# Patient Record
Sex: FEMALE | Race: WHITE | Employment: OTHER | URBAN - METROPOLITAN AREA
[De-identification: names, ages, dates, MRNs, and addresses within clinical notes are randomized per-mention and may not be internally consistent; named-entity substitution may affect disease eponyms.]

---

## 2019-12-02 ENCOUNTER — TELEPHONE (OUTPATIENT)
Dept: GYNECOLOGY | Age: 69
End: 2019-12-02

## 2019-12-02 ENCOUNTER — OFFICE VISIT (OUTPATIENT)
Dept: GYNECOLOGY | Age: 69
End: 2019-12-02

## 2019-12-02 VITALS
HEIGHT: 64 IN | BODY MASS INDEX: 28.17 KG/M2 | WEIGHT: 165 LBS | DIASTOLIC BLOOD PRESSURE: 79 MMHG | SYSTOLIC BLOOD PRESSURE: 114 MMHG

## 2019-12-02 DIAGNOSIS — R19.00 PELVIC MASS: Primary | ICD-10-CM

## 2019-12-02 RX ORDER — NITROFURANTOIN 25; 75 MG/1; MG/1
CAPSULE ORAL
Refills: 0 | COMMUNITY
Start: 2019-11-27 | End: 2019-12-12

## 2019-12-02 NOTE — LETTER
2019 12:09 PM 
 
Patient:  Phuc Shipman YOB: 1950 Date of Visit: 2019 Dear Aston Xavier MD 
[address] [address] VIA In Basket Tamika Miller MD 
[address] [facility] [address] [place] [address] VIA Facsimile: [phone] 
 : 
 
 
Thank you for referring Ms. Phuc Shipman to me for evaluation/treatment. Below are the relevant portions of my assessment and plan of care. [address], [address] [facility] [address], [address] 
P [phone]  F [phone] Office Note Patient ID: 
Name:  Phuc Shipman MRN:  [id number] :  1950/69 y.o. Date:  2019 HISTORY OF PRESENT ILLNESS: 
Phuc Shipman is a 77 y.o.  postmenopausal female who is being seen for a large pelvic mass. She is referred by Dr. Fredi Rolon. She was being evaluated for hematuria when a CT of the abdomen/pelvis and subsequent pelvic ultrasound both demonstrated a large cystic pelvic mass, likely arising from the right ovary, measuring 14 x 9 x 18 cm. On CT the mass appeared simple, other than a thin vascularized septum. There were no appreciable solid components. The left ovary appeared normal.  The uterus also appeared normal, aside from some coarse calcifications, likely secondary to leiomyomas. There was some questionable mild thickening of the endocervical canal.  There was no free fluid in the the abdomen and no evidence of metastatic disease. On ultrasound, the mass appeared mor complex, with multiple septations. Due to the possibility of malignancy, I have been asked to see her in consultation for further evaluation and management. A CA-125 drawn on 19 was normal at 15.8. Dr. Breann Hagen had performed a cystoscopy and was concerned about a possible bladder cancer. He is planning an outpatient cystoscopy with biopsy/resection of the affected areas.    
  
 
ROS: 
  and GI review:  Negative Cardiopulmonary review:  Negative Musculoskeletal:  Negative A comprehensive review of systems was negative except for that written in the History of Present Illness. , 10 point ROS 
 
 
OB/GYN ROS: 
 There is no history of significant gyn problems or procedures. Patient denies any abnormal bleeding or vaginal discharge. Problem List: 
There are no active problems to display for this patient. PMH: 
Past Medical History:  
Diagnosis Date  Emphysema of lung ([address]  Hx: UTI (urinary tract infection)  Renal cyst   
  
PSH: 
Past Surgical History:  
Procedure Laterality Date  HX OTHER SURGICAL Left   
 fibrocystic tumor  left breast  
 HX TONSILLECTOMY Social History: 
Social History Tobacco Use  Smoking status: Current Every Day Smoker  Smokeless tobacco: Never Used Substance Use Topics  Alcohol use: Not on file Family History: 
History reviewed. No pertinent family history. Medications: (reviewed) Current Outpatient Medications Medication Sig  
 nitrofurantoin, macrocrystal-monohydrate, (MACROBID) 100 mg capsule TAKE 1 CAPSULE BY MOUTH TWICE DAILY No current facility-administered medications for this visit. Allergies: (reviewed) Allergies Allergen Reactions  Cat Dander Other (comments)  Ciprofloxacin Itching  Iodine Other (comments)  Mustard Other (comments)  Sesame Seed Other (comments)  Shellfish Derived Other (comments)  Sulfa (Sulfonamide Antibiotics) Other (comments) Swelling in throat and eyes  Tree Nut Other (comments) OBJECTIVE: 
 
Physical Exam: VITAL SIGNS: Vitals:  
 19 1042 BP: 114/79 Weight: 165 lb (74.8 kg) Height: 5' 4\" (1.626 m) Body mass index is 28.32 kg/m². GENERAL ETHEL: Conversant, alert, oriented. No acute distress. HEENT: HEENT. No thyroid enlargement. No JVD. Neck: Supple without restrictions. RESPIRATORY: Clear to auscultation and percussion to the bases. No CVAT. CARDIOVASC: RRR without murmur/rub. GASTROINT: soft, non-tender, non-distended; palpable mass in lower abdomen MUSCULOSKEL: no joint tenderness, deformity or swelling EXTREMITIES: extremities normal, atraumatic, no cyanosis or edema PELVIC: Normal external genitalia. Normal vagina. Normal cervix. Uterus displaced anteriorly by the mass. Cul de sac smooth. No nodularity. RECTAL: Deferred HEATH SURVEY: No suspicious lymphadenopathy or edema noted. NEURO: Grossly intact. No acute deficit. Lab Data: 
 
No results found for: WBC, HGB, HCT, PLT, MCV, HGBEXT, HCTEXT, PLTEXT, HGBEXT, HCTEXT, PLTEXT No results found for: NA, K, CL, CO2, AGAP, GLU, BUN, CREA, BUCR, GFRAA, GFRNA, CA Imaging: Outside CT form [facility] reviewed. Pertinent findings noted in HPI. IMPRESSION/PLAN: 
Nevin Garvey is a 77 y.o. female with a working diagnosis of large cystic pelvic mass, which appears to be arising from the right ovary. She also has some dilatation of the endocervical canal, but no vaginal bleeding or discharge. I reviewed with Nevin Garvey her medical records, physical exam, and review of symptoms. I discussed with her the differential diagnosis of the mass, including both benign and malignant etiologies. I recommended laparoscopic evaluation with resection of the mass with frozen section pathology. I also recommended removing the contralateral ovary. I also recommended hysterectomy as well, due to the dilatation of the endocervical canal.  Clinically the cervix appeared normal.  We will also send the uterus for frozen section pathology. She is aware that a laparotomy might be required, but that would be unlikely. She was counseled on the risks, benefits, indications, and alternatives of surgery. Her questions were answered and she wishes to proceed as planned.  
 
 
 
Signed By: Devendra Franks MD   
 12/2/2019/8:46 AM  
 
 
 
If you have questions, please do not hesitate to call me. I look forward to following Ms. Ana Erasto along with you.  
 
 
 
Sincerely, 
 
 
Romulo Montana MD

## 2019-12-02 NOTE — PATIENT INSTRUCTIONS
[employer] Activation    Thank you for requesting access to [employer]. Please follow the instructions below to securely access and download your online medical record. [employer] allows you to send messages to your doctor, view your test results, renew your prescriptions, schedule appointments, and more. How Do I Sign Up? 1. In your internet browser, go to [email or web address]. 2. Click on the First Time User? Click Here link in the Sign In box. You will see the New Member Sign Up page. 3. Enter your [employer] Access Code exactly as it appears below. You will not need to use this code after youve completed the sign-up process. If you do not sign up before the expiration date, you must request a new code. [employer] Access Code: [email or web address]  Expires: 2020 10:26 AM (This is the date your [employer] access code will )    4. Enter the last four digits of your Social Security Number (xxxx) and Date of Birth (mm/dd/yyyy) as indicated and click Submit. You will be taken to the next sign-up page. 5. Create a [employer] ID. This will be your [employer] login ID and cannot be changed, so think of one that is secure and easy to remember. 6. Create a [employer] password. You can change your password at any time. 7. Enter your Password Reset Question and Answer. This can be used at a later time if you forget your password. 8. Enter your e-mail address. You will receive e-mail notification when new information is available in [address]. 9. Click Sign Up. You can now view and download portions of your medical record. 10. Click the Download Summary menu link to download a portable copy of your medical information. Additional Information    If you have questions, please visit the Frequently Asked Questions section of the [employer] website at [email or web address] Remember, [employer] is NOT to be used for urgent needs. For medical emergencies, dial 911.

## 2019-12-02 NOTE — PROGRESS NOTES
[address] [facility] [address], [address] [facility] [phone]  F [phone]    Office Note  Patient ID:  Name:  Nae Neff  MRN:  [id number]  :  69 y.o. Date:  2019      HISTORY OF PRESENT ILLNESS:  Nae Neff is a 77 y.o.  postmenopausal female who is being seen for a large pelvic mass. She is referred by Dr. Charmayne Guillaume. She was being evaluated for hematuria when a CT of the abdomen/pelvis and subsequent pelvic ultrasound both demonstrated a large cystic pelvic mass, likely arising from the right ovary, measuring 14 x 9 x 18 cm. On CT the mass appeared simple, other than a thin vascularized septum. There were no appreciable solid components. The left ovary appeared normal.  The uterus also appeared normal, aside from some coarse calcifications, likely secondary to leiomyomas. There was some questionable mild thickening of the endocervical canal.  There was no free fluid in the the abdomen and no evidence of metastatic disease. On ultrasound, the mass appeared mor complex, with multiple septations. Due to the possibility of malignancy, I have been asked to see her in consultation for further evaluation and management. A CA-125 drawn on 19 was normal at 15.8. Dr. Liss Villarreal had performed a cystoscopy and was concerned about a possible bladder cancer. He is planning an outpatient cystoscopy with biopsy/resection of the affected areas. ROS:   and GI review:  Negative  Cardiopulmonary review:  Negative   Musculoskeletal:  Negative    A comprehensive review of systems was negative except for that written in the History of Present Illness. , 10 point ROS      OB/GYN ROS:    There is no history of significant gyn problems or procedures. Patient denies any abnormal bleeding or vaginal discharge. Problem List:  There are no active problems to display for this patient.     PMH:  Past Medical History:   Diagnosis Date    Emphysema of lung ([address]     Hx: UTI (urinary tract infection)     Renal cyst       PSH:  Past Surgical History:   Procedure Laterality Date    HX OTHER SURGICAL Left     fibrocystic tumor  left breast    HX TONSILLECTOMY        Social History:  Social History     Tobacco Use    Smoking status: Current Every Day Smoker    Smokeless tobacco: Never Used   Substance Use Topics    Alcohol use: Not on file      Family History:  History reviewed. No pertinent family history. Medications: (reviewed)  Current Outpatient Medications   Medication Sig    nitrofurantoin, macrocrystal-monohydrate, (MACROBID) 100 mg capsule TAKE 1 CAPSULE BY MOUTH TWICE DAILY     No current facility-administered medications for this visit. Allergies: (reviewed)  Allergies   Allergen Reactions    Cat Dander Other (comments)    Ciprofloxacin Itching    Iodine Other (comments)    Mustard Other (comments)    Sesame Seed Other (comments)    Shellfish Derived Other (comments)    Sulfa (Sulfonamide Antibiotics) Other (comments)     Swelling in throat and eyes    Tree Nut Other (comments)          OBJECTIVE:    Physical Exam:  VITAL SIGNS: Vitals:    12/02/19 1042   BP: 114/79   Weight: 165 lb (74.8 kg)   Height: 5' 4\" (1.626 m)     Body mass index is 28.32 kg/m². GENERAL ETHEL: Conversant, alert, oriented. No acute distress. HEENT: HEENT. No thyroid enlargement. No JVD. Neck: Supple without restrictions. RESPIRATORY: Clear to auscultation and percussion to the bases. No CVAT. CARDIOVASC: RRR without murmur/rub. GASTROINT: soft, non-tender, non-distended; palpable mass in lower abdomen   MUSCULOSKEL: no joint tenderness, deformity or swelling   EXTREMITIES: extremities normal, atraumatic, no cyanosis or edema   PELVIC: Normal external genitalia. Normal vagina. Normal cervix. Uterus displaced anteriorly by the mass. Cul de sac smooth. No nodularity.    RECTAL: Deferred   HEATH SURVEY: No suspicious lymphadenopathy or edema noted. NEURO: Grossly intact. No acute deficit. Lab Data:    No results found for: WBC, HGB, HCT, PLT, MCV, HGBEXT, HCTEXT, PLTEXT, HGBEXT, HCTEXT, PLTEXT  No results found for: NA, K, CL, CO2, AGAP, GLU, BUN, CREA, BUCR, GFRAA, GFRNA, CA      Imaging: Outside CT form [facility] reviewed. Pertinent findings noted in HPI. IMPRESSION/PLAN:  Polly Hernandez is a 77 y.o. female with a working diagnosis of large cystic pelvic mass, which appears to be arising from the right ovary. She also has some dilatation of the endocervical canal, but no vaginal bleeding or discharge. I reviewed with Polly Hernandez her medical records, physical exam, and review of symptoms. I discussed with her the differential diagnosis of the mass, including both benign and malignant etiologies. I recommended laparoscopic evaluation with resection of the mass with frozen section pathology. I also recommended removing the contralateral ovary. I also recommended hysterectomy as well, due to the dilatation of the endocervical canal.  Clinically the cervix appeared normal.  We will also send the uterus for frozen section pathology. She is aware that a laparotomy might be required, but that would be unlikely. She was counseled on the risks, benefits, indications, and alternatives of surgery. Her questions were answered and she wishes to proceed as planned.         Signed By: Audrey Dillon MD     12/2/2019/8:46 AM

## 2019-12-10 ENCOUNTER — TELEPHONE (OUTPATIENT)
Dept: GYNECOLOGY | Age: 69
End: 2019-12-10

## 2019-12-10 DIAGNOSIS — G89.18 POST-OPERATIVE PAIN: Primary | ICD-10-CM

## 2019-12-10 NOTE — TELEPHONE ENCOUNTER
The patient called with several questions. First is should she arrange to have the recommended biopsy of her bladder at the same time of her surgery? Secondly, can she have any prescriptions that may be prescribed sent to her pharmacy prior to her surgery because her  is not physically able to pick them up for her ?

## 2019-12-11 RX ORDER — TRAMADOL HYDROCHLORIDE 50 MG/1
50 TABLET ORAL
Qty: 25 TAB | Refills: 0 | OUTPATIENT
Start: 2019-12-11 | End: 2019-12-13 | Stop reason: SDUPTHER

## 2019-12-11 NOTE — TELEPHONE ENCOUNTER
Called the patient and advised she will need to call [facility] to see if they would do it at that time. Valerie Hale does not think they would do that and will want to do that in the office. The patient would like to wait until Mari Short is back from leave for him to perform. I advised the patient that per  that a prescription for the Tramadol will be called to her pharmacy. I also advised to have Colace on hand and Tylenol/Advil.

## 2019-12-12 ENCOUNTER — HOSPITAL ENCOUNTER (OUTPATIENT)
Dept: GENERAL RADIOLOGY | Age: 69
Discharge: HOME | End: 2019-12-12
Attending: OBSTETRICS & GYNECOLOGY
Payer: MEDICARE

## 2019-12-12 ENCOUNTER — HOSPITAL ENCOUNTER (OUTPATIENT)
Dept: PREADMISSION TESTING | Age: 69
Discharge: HOME | End: 2019-12-12
Payer: MEDICARE

## 2019-12-12 VITALS
SYSTOLIC BLOOD PRESSURE: 117 MMHG | WEIGHT: 165 LBS | HEIGHT: 64 IN | BODY MASS INDEX: 28.17 KG/M2 | HEART RATE: 71 BPM | DIASTOLIC BLOOD PRESSURE: 75 MMHG | TEMPERATURE: 97.6 F

## 2019-12-12 LAB
ALBUMIN SERPL-MCNC: 3.7 G/DL (ref 3.5–5)
ALBUMIN/GLOB SERPL: 1.1 {RATIO} (ref 1.1–2.2)
ALP SERPL-CCNC: 78 U/L (ref 45–117)
ALT SERPL-CCNC: 12 U/L (ref 12–78)
ANION GAP SERPL CALC-SCNC: 6 MMOL/L (ref 5–15)
AST SERPL-CCNC: 8 U/L (ref 15–37)
ATRIAL RATE: 58 BPM
BASOPHILS # BLD: 0.1 K/UL (ref 0–0.1)
BASOPHILS NFR BLD: 1 % (ref 0–1)
BILIRUB SERPL-MCNC: 0.4 MG/DL (ref 0.2–1)
BUN SERPL-MCNC: 16 MG/DL (ref 6–20)
BUN/CREAT SERPL: 19 (ref 12–20)
CALCIUM SERPL-MCNC: 8.9 MG/DL (ref 8.5–10.1)
CALCULATED P AXIS, ECG09: 67 DEGREES
CALCULATED R AXIS, ECG10: 6 DEGREES
CALCULATED T AXIS, ECG11: 37 DEGREES
CHLORIDE SERPL-SCNC: 106 MMOL/L (ref 97–108)
CO2 SERPL-SCNC: 28 MMOL/L (ref 21–32)
CREAT SERPL-MCNC: 0.83 MG/DL (ref 0.55–1.02)
DIAGNOSIS, 93000: NORMAL
DIFFERENTIAL METHOD BLD: ABNORMAL
EOSINOPHIL # BLD: 0.2 K/UL (ref 0–0.4)
EOSINOPHIL NFR BLD: 2 % (ref 0–7)
ERYTHROCYTE [DISTWIDTH] IN BLOOD BY AUTOMATED COUNT: 14.6 % (ref 11.5–14.5)
GLOBULIN SER CALC-MCNC: 3.3 G/DL (ref 2–4)
GLUCOSE SERPL-MCNC: 93 MG/DL (ref 65–100)
HCT VFR BLD AUTO: 48.3 % (ref 35–47)
HGB BLD-MCNC: 15.3 G/DL (ref 11.5–16)
IMM GRANULOCYTES # BLD AUTO: 0 K/UL (ref 0–0.04)
IMM GRANULOCYTES NFR BLD AUTO: 0 % (ref 0–0.5)
LYMPHOCYTES # BLD: 1.9 K/UL (ref 0.8–3.5)
LYMPHOCYTES NFR BLD: 23 % (ref 12–49)
MCH RBC QN AUTO: 30.9 PG (ref 26–34)
MCHC RBC AUTO-ENTMCNC: 31.7 G/DL (ref 30–36.5)
MCV RBC AUTO: 97.6 FL (ref 80–99)
MONOCYTES # BLD: 0.6 K/UL (ref 0–1)
MONOCYTES NFR BLD: 8 % (ref 5–13)
NEUTS SEG # BLD: 5.6 K/UL (ref 1.8–8)
NEUTS SEG NFR BLD: 66 % (ref 32–75)
NRBC # BLD: 0 K/UL (ref 0–0.01)
NRBC BLD-RTO: 0 PER 100 WBC
P-R INTERVAL, ECG05: 154 MS
PLATELET # BLD AUTO: 350 K/UL (ref 150–400)
PMV BLD AUTO: 10.2 FL (ref 8.9–12.9)
POTASSIUM SERPL-SCNC: 4 MMOL/L (ref 3.5–5.1)
PROT SERPL-MCNC: 7 G/DL (ref 6.4–8.2)
Q-T INTERVAL, ECG07: 440 MS
QRS DURATION, ECG06: 84 MS
QTC CALCULATION (BEZET), ECG08: 431 MS
RBC # BLD AUTO: 4.95 M/UL (ref 3.8–5.2)
SODIUM SERPL-SCNC: 140 MMOL/L (ref 136–145)
VENTRICULAR RATE, ECG03: 58 BPM
WBC # BLD AUTO: 8.4 K/UL (ref 3.6–11)

## 2019-12-12 PROCEDURE — 93005 ELECTROCARDIOGRAM TRACING: CPT

## 2019-12-12 PROCEDURE — 85025 COMPLETE CBC W/AUTO DIFF WBC: CPT

## 2019-12-12 PROCEDURE — 36415 COLL VENOUS BLD VENIPUNCTURE: CPT

## 2019-12-12 PROCEDURE — 80053 COMPREHEN METABOLIC PANEL: CPT

## 2019-12-12 PROCEDURE — 71046 X-RAY EXAM CHEST 2 VIEWS: CPT

## 2019-12-12 RX ORDER — ACETAMINOPHEN 325 MG/1
650 TABLET ORAL AS NEEDED
COMMUNITY

## 2019-12-12 NOTE — PERIOP NOTES
PREOPERATIVE INSTRUCTIONS REVIEWED WITH PATIENT. PATIENT GIVEN TWO-- BOTTLES OF CHG SOAPS  INSTRUCTIONS REVIEWED ON USE OF CHG SOAPS   PATIENT GIVEN SSI INFECTIONS SHEET. PATIENT WAS GIVEN THE OPPORTUNITY TO ASK QUESTIONS ON THE INFORMATION PROVIDED. PATIENT GIVEN INSTRUCTIONS/DIRECTIONS FOR CXR TO BE DONE AT [address] [place] [address]; ORDER ENTERED.

## 2019-12-13 DIAGNOSIS — G89.18 POST-OPERATIVE PAIN: ICD-10-CM

## 2019-12-13 RX ORDER — TRAMADOL HYDROCHLORIDE 50 MG/1
50 TABLET ORAL
Qty: 25 TAB | Refills: 0 | Status: ON HOLD | OUTPATIENT
Start: 2019-12-13 | End: 2019-12-18 | Stop reason: SDUPTHER

## 2019-12-13 NOTE — TELEPHONE ENCOUNTER
The patient called stating her Tramadol was called to the wrong [facility].  Advised the patient that it will be called to the [place] [facility].

## 2019-12-16 ENCOUNTER — TELEPHONE (OUTPATIENT)
Dept: GYNECOLOGY | Age: 69
End: 2019-12-16

## 2019-12-16 NOTE — TELEPHONE ENCOUNTER
The patient called to see if she needs to wear a supportive belt following surgery. I advised her that it is not necessary but her preference.

## 2019-12-17 NOTE — H&P
[address] [facility] [address] [facility], [address] [place] [address]  P [phone]  F [phone]    Office Note  Patient ID:  Name:  Nikhil Collier  MRN:  [id number]  :  69 y.o. Date:  2019      HISTORY OF PRESENT ILLNESS:  Nikhil Collier is a 77 y.o.  postmenopausal female who is being seen for a large pelvic mass. She is referred by Dr. Alia Johnston. She was being evaluated for hematuria when a CT of the abdomen/pelvis and subsequent pelvic ultrasound both demonstrated a large cystic pelvic mass, likely arising from the right ovary, measuring 14 x 9 x 18 cm. On CT the mass appeared simple, other than a thin vascularized septum. There were no appreciable solid components. The left ovary appeared normal.  The uterus also appeared normal, aside from some coarse calcifications, likely secondary to leiomyomas. There was some questionable mild thickening of the endocervical canal.  There was no free fluid in the the abdomen and no evidence of metastatic disease. On ultrasound, the mass appeared mor complex, with multiple septations. Due to the possibility of malignancy, I have been asked to see her in consultation for further evaluation and management. A CA-125 drawn on 19 was normal at 15.8. Dr. Joselo Montgomery had performed a cystoscopy and was concerned about a possible bladder cancer. He is planning an outpatient cystoscopy with biopsy/resection of the affected areas. ROS:   and GI review:  Negative  Cardiopulmonary review:  Negative   Musculoskeletal:  Negative    A comprehensive review of systems was negative except for that written in the History of Present Illness. , 10 point ROS      OB/GYN ROS:    There is no history of significant gyn problems or procedures. Patient denies any abnormal bleeding or vaginal discharge. Problem List:  There are no active problems to display for this patient.     PMH:  Past Medical History: Diagnosis Date    Arthritis     Chronic pain     Cough     Emphysema of lung (HCC)     Gross hematuria     Hx: UTI (urinary tract infection)     Nausea & vomiting     Pelvic mass     Renal cyst       PSH:  Past Surgical History:   Procedure Laterality Date    BREAST SURGERY PROCEDURE UNLISTED Left 1973    CYST REMOVED    HX ADENOIDECTOMY  1955    HX GI      COLONOSCOPY    HX HEENT  1970    BROKEN NOSE    HX OTHER SURGICAL Left     fibrocystic tumor  left breast    HX POLYPECTOMY  2009    HX [address]    HX UROLOGICAL  11/2019    CYSTOSCOPY    HX UROLOGICAL  1999    CYSTOSCOPY    HX UROLOGICAL  7996,2258    CYSTOSCOPY      Social History:  Social History     Tobacco Use    Smoking status: Current Every Day Smoker     Packs/day: 1.00     Years: 54.00     Pack years: 54.00    Smokeless tobacco: Never Used   Substance Use Topics    Alcohol use: Never     Frequency: Never      Family History:  Family History   Problem Relation Age of Onset    Diabetes Mother     Heart Disease Mother     Emphysema Father     Anesth Problems Neg Hx       Medications: (reviewed)  No current facility-administered medications for this encounter. Current Outpatient Medications   Medication Sig    traMADol (ULTRAM) 50 mg tablet Take 1 Tab by mouth every six (6) hours as needed for Pain for up to 14 days. Max Daily Amount: 200 mg.  acetaminophen (TYLENOL) 325 mg tablet Take 650 mg by mouth as needed for Pain.      Allergies: (reviewed)  Allergies   Allergen Reactions    Cat Dander Other (comments)    Ciprofloxacin Itching    Iodine Other (comments)    Mustard Other (comments)    Sesame Seed Other (comments)    Shellfish Derived Other (comments)    Sulfa (Sulfonamide Antibiotics) Other (comments)     Swelling in throat and eyes    Tree Nut Other (comments)    Bee Venom Protein (Honey Bee) Swelling    Wasp [Venom-Wasp] Swelling          OBJECTIVE:    Physical Exam:  VITAL SIGNS: There were no vitals filed for this visit. There is no height or weight on file to calculate BMI. GENERAL ETHEL: Conversant, alert, oriented. No acute distress. HEENT: HEENT. No thyroid enlargement. No JVD. Neck: Supple without restrictions. RESPIRATORY: Clear to auscultation and percussion to the bases. No CVAT. CARDIOVASC: RRR without murmur/rub. GASTROINT: soft, non-tender, non-distended; palpable mass in lower abdomen   MUSCULOSKEL: no joint tenderness, deformity or swelling   EXTREMITIES: extremities normal, atraumatic, no cyanosis or edema   PELVIC: Normal external genitalia. Normal vagina. Normal cervix. Uterus displaced anteriorly by the mass. Cul de sac smooth. No nodularity. RECTAL: Deferred   HEATH SURVEY: No suspicious lymphadenopathy or edema noted. NEURO: Grossly intact. No acute deficit. Lab Data:    Lab Results   Component Value Date/Time    WBC 8.4 12/12/2019 11:10 AM    HGB 15.3 12/12/2019 11:10 AM    HCT 48.3 (H) 12/12/2019 11:10 AM    PLATELET 325 39/97/4260 11:10 AM    MCV 97.6 12/12/2019 11:10 AM     Lab Results   Component Value Date/Time    Sodium 140 12/12/2019 11:10 AM    Potassium 4.0 12/12/2019 11:10 AM    Chloride 106 12/12/2019 11:10 AM    CO2 28 12/12/2019 11:10 AM    Anion gap 6 12/12/2019 11:10 AM    Glucose 93 12/12/2019 11:10 AM    BUN 16 12/12/2019 11:10 AM    Creatinine 0.83 12/12/2019 11:10 AM    BUN/Creatinine ratio 19 12/12/2019 11:10 AM    GFR est AA >60 12/12/2019 11:10 AM    GFR est non-AA >60 12/12/2019 11:10 AM    Calcium 8.9 12/12/2019 11:10 AM         Imaging: Outside CT form [facility] reviewed. Pertinent findings noted in HPI. IMPRESSION/PLAN:  Wolf Franco is a 77 y.o. female with a working diagnosis of large cystic pelvic mass, which appears to be arising from the right ovary. She also has some dilatation of the endocervical canal, but no vaginal bleeding or discharge.   I reviewed with Wolf Franco her medical records, physical exam, and review of symptoms. I discussed with her the differential diagnosis of the mass, including both benign and malignant etiologies. I recommended laparoscopic evaluation with resection of the mass with frozen section pathology. I also recommended removing the contralateral ovary. I also recommended hysterectomy as well, due to the dilatation of the endocervical canal.  Clinically the cervix appeared normal.  We will also send the uterus for frozen section pathology. She is aware that a laparotomy might be required, but that would be unlikely. She was counseled on the risks, benefits, indications, and alternatives of surgery. Her questions were answered and she wishes to proceed as planned. Signed By: Yonny Parisi MD     12/17/2019/8:46 AM       Date of Surgery Update:  Janis Henao was seen and examined. History and physical has been reviewed. The patient has been examined. There have been no significant clinical changes since the completion of the originally dated History and Physical.  Patient identified by surgeon; surgical site was confirmed by patient and surgeon.     Signed By: Yonny Parisi MD     December 18, 2019 8:24 AM

## 2019-12-18 ENCOUNTER — ANESTHESIA EVENT (OUTPATIENT)
Dept: SURGERY | Age: 69
End: 2019-12-18
Payer: MEDICARE

## 2019-12-18 ENCOUNTER — HOSPITAL ENCOUNTER (OUTPATIENT)
Age: 69
Setting detail: OBSERVATION
Discharge: HOME | End: 2019-12-19
Attending: OBSTETRICS & GYNECOLOGY | Admitting: OBSTETRICS & GYNECOLOGY
Payer: MEDICARE

## 2019-12-18 ENCOUNTER — ANESTHESIA (OUTPATIENT)
Dept: SURGERY | Age: 69
End: 2019-12-18
Payer: MEDICARE

## 2019-12-18 DIAGNOSIS — G89.18 POST-OPERATIVE PAIN: ICD-10-CM

## 2019-12-18 PROBLEM — R19.00 PELVIC MASS: Status: ACTIVE | Noted: 2019-12-18

## 2019-12-18 PROCEDURE — 77030008756 HC TU IRR SUC STRY -B: Performed by: OBSTETRICS & GYNECOLOGY

## 2019-12-18 PROCEDURE — 77030009851 HC PCH RTVR ENDOSC AMR -B: Performed by: OBSTETRICS & GYNECOLOGY

## 2019-12-18 PROCEDURE — 74011250637 HC RX REV CODE- 250/637: Performed by: OBSTETRICS & GYNECOLOGY

## 2019-12-18 PROCEDURE — 77030002882 HC SUT CART KNOT LSIS -B: Performed by: OBSTETRICS & GYNECOLOGY

## 2019-12-18 PROCEDURE — 74011000258 HC RX REV CODE- 258: Performed by: OBSTETRICS & GYNECOLOGY

## 2019-12-18 PROCEDURE — 77030040361 HC SLV COMPR DVT MDII -B: Performed by: OBSTETRICS & GYNECOLOGY

## 2019-12-18 PROCEDURE — 77030040830 HC CATH URETH FOL MDII -A: Performed by: OBSTETRICS & GYNECOLOGY

## 2019-12-18 PROCEDURE — 76060000034 HC ANESTHESIA 1.5 TO 2 HR: Performed by: OBSTETRICS & GYNECOLOGY

## 2019-12-18 PROCEDURE — 74011250636 HC RX REV CODE- 250/636: Performed by: ANESTHESIOLOGY

## 2019-12-18 PROCEDURE — 74011000250 HC RX REV CODE- 250: Performed by: NURSE ANESTHETIST, CERTIFIED REGISTERED

## 2019-12-18 PROCEDURE — 77030018778 HC MANIP UTER VCAR CNMD -B: Performed by: OBSTETRICS & GYNECOLOGY

## 2019-12-18 PROCEDURE — 74011250636 HC RX REV CODE- 250/636: Performed by: OBSTETRICS & GYNECOLOGY

## 2019-12-18 PROCEDURE — 77030010507 HC ADH SKN DERMBND J&J -B: Performed by: OBSTETRICS & GYNECOLOGY

## 2019-12-18 PROCEDURE — 77030012770 HC TRCR OPT FX AMR -B: Performed by: OBSTETRICS & GYNECOLOGY

## 2019-12-18 PROCEDURE — 88331 PATH CONSLTJ SURG 1 BLK 1SPC: CPT

## 2019-12-18 PROCEDURE — 88307 TISSUE EXAM BY PATHOLOGIST: CPT

## 2019-12-18 PROCEDURE — 77030020829: Performed by: OBSTETRICS & GYNECOLOGY

## 2019-12-18 PROCEDURE — 77030002904 HC SUT DEV RNNG LSIS -C: Performed by: OBSTETRICS & GYNECOLOGY

## 2019-12-18 PROCEDURE — 77030002968 HC SUT PDS LSIS -B: Performed by: OBSTETRICS & GYNECOLOGY

## 2019-12-18 PROCEDURE — 77030033639 HC SHR ENDO COAG HARM 36 J&J -E: Performed by: OBSTETRICS & GYNECOLOGY

## 2019-12-18 PROCEDURE — 77030011640 HC PAD GRND REM COVD -A: Performed by: OBSTETRICS & GYNECOLOGY

## 2019-12-18 PROCEDURE — 77030002903 HC SUT DEV PLCMNT LSIS -C: Performed by: OBSTETRICS & GYNECOLOGY

## 2019-12-18 PROCEDURE — 76210000006 HC OR PH I REC 0.5 TO 1 HR: Performed by: OBSTETRICS & GYNECOLOGY

## 2019-12-18 PROCEDURE — 76010000153 HC OR TIME 1.5 TO 2 HR: Performed by: OBSTETRICS & GYNECOLOGY

## 2019-12-18 PROCEDURE — 99218 HC RM OBSERVATION: CPT

## 2019-12-18 PROCEDURE — 74011000250 HC RX REV CODE- 250: Performed by: OBSTETRICS & GYNECOLOGY

## 2019-12-18 PROCEDURE — 77030020263 HC SOL INJ SOD CL0.9% LFCR 1000ML: Performed by: OBSTETRICS & GYNECOLOGY

## 2019-12-18 PROCEDURE — 88112 CYTOPATH CELL ENHANCE TECH: CPT

## 2019-12-18 PROCEDURE — 77030040922 HC BLNKT HYPOTHRM STRY -A

## 2019-12-18 PROCEDURE — 77030026438 HC STYL ET INTUB CARD -A: Performed by: NURSE ANESTHETIST, CERTIFIED REGISTERED

## 2019-12-18 PROCEDURE — 77030002933 HC SUT MCRYL J&J -A: Performed by: OBSTETRICS & GYNECOLOGY

## 2019-12-18 PROCEDURE — 77030008684 HC TU ET CUF COVD -B: Performed by: NURSE ANESTHETIST, CERTIFIED REGISTERED

## 2019-12-18 PROCEDURE — 77030018836 HC SOL IRR NACL ICUM -A: Performed by: OBSTETRICS & GYNECOLOGY

## 2019-12-18 PROCEDURE — 74011250636 HC RX REV CODE- 250/636: Performed by: NURSE ANESTHETIST, CERTIFIED REGISTERED

## 2019-12-18 RX ORDER — SODIUM CHLORIDE, SODIUM LACTATE, POTASSIUM CHLORIDE, CALCIUM CHLORIDE 600; 310; 30; 20 MG/100ML; MG/100ML; MG/100ML; MG/100ML
125 INJECTION, SOLUTION INTRAVENOUS CONTINUOUS
Status: DISCONTINUED | OUTPATIENT
Start: 2019-12-18 | End: 2019-12-18 | Stop reason: HOSPADM

## 2019-12-18 RX ORDER — SODIUM CHLORIDE, SODIUM LACTATE, POTASSIUM CHLORIDE, CALCIUM CHLORIDE 600; 310; 30; 20 MG/100ML; MG/100ML; MG/100ML; MG/100ML
75 INJECTION, SOLUTION INTRAVENOUS CONTINUOUS
Status: DISCONTINUED | OUTPATIENT
Start: 2019-12-18 | End: 2019-12-18 | Stop reason: HOSPADM

## 2019-12-18 RX ORDER — ACETAMINOPHEN 10 MG/ML
1000 INJECTION, SOLUTION INTRAVENOUS EVERY 8 HOURS
Status: DISCONTINUED | OUTPATIENT
Start: 2019-12-18 | End: 2019-12-19 | Stop reason: HOSPADM

## 2019-12-18 RX ORDER — DIPHENHYDRAMINE HYDROCHLORIDE 50 MG/ML
12.5 INJECTION, SOLUTION INTRAMUSCULAR; INTRAVENOUS AS NEEDED
Status: DISCONTINUED | OUTPATIENT
Start: 2019-12-18 | End: 2019-12-18 | Stop reason: HOSPADM

## 2019-12-18 RX ORDER — SODIUM CHLORIDE 9 MG/ML
25 INJECTION, SOLUTION INTRAVENOUS CONTINUOUS
Status: DISCONTINUED | OUTPATIENT
Start: 2019-12-18 | End: 2019-12-18 | Stop reason: HOSPADM

## 2019-12-18 RX ORDER — TRAMADOL HYDROCHLORIDE 50 MG/1
50-100 TABLET ORAL
Qty: 15 TAB | Refills: 0 | Status: SHIPPED | OUTPATIENT
Start: 2019-12-18 | End: 2019-12-23

## 2019-12-18 RX ORDER — IBUPROFEN 200 MG
400 TABLET ORAL
Qty: 50 TAB | Refills: 0 | Status: SHIPPED | OUTPATIENT
Start: 2019-12-18

## 2019-12-18 RX ORDER — HYDROMORPHONE HYDROCHLORIDE 1 MG/ML
0.2 INJECTION, SOLUTION INTRAMUSCULAR; INTRAVENOUS; SUBCUTANEOUS
Status: DISCONTINUED | OUTPATIENT
Start: 2019-12-18 | End: 2019-12-18 | Stop reason: HOSPADM

## 2019-12-18 RX ORDER — SODIUM CHLORIDE 0.9 % (FLUSH) 0.9 %
5-40 SYRINGE (ML) INJECTION AS NEEDED
Status: DISCONTINUED | OUTPATIENT
Start: 2019-12-18 | End: 2019-12-18 | Stop reason: HOSPADM

## 2019-12-18 RX ORDER — MIDAZOLAM HYDROCHLORIDE 1 MG/ML
INJECTION, SOLUTION INTRAMUSCULAR; INTRAVENOUS AS NEEDED
Status: DISCONTINUED | OUTPATIENT
Start: 2019-12-18 | End: 2019-12-18 | Stop reason: HOSPADM

## 2019-12-18 RX ORDER — ACETAMINOPHEN 10 MG/ML
INJECTION, SOLUTION INTRAVENOUS AS NEEDED
Status: DISCONTINUED | OUTPATIENT
Start: 2019-12-18 | End: 2019-12-18 | Stop reason: HOSPADM

## 2019-12-18 RX ORDER — SODIUM CHLORIDE 0.9 % (FLUSH) 0.9 %
5-40 SYRINGE (ML) INJECTION EVERY 8 HOURS
Status: DISCONTINUED | OUTPATIENT
Start: 2019-12-18 | End: 2019-12-19 | Stop reason: HOSPADM

## 2019-12-18 RX ORDER — ROCURONIUM BROMIDE 10 MG/ML
INJECTION, SOLUTION INTRAVENOUS AS NEEDED
Status: DISCONTINUED | OUTPATIENT
Start: 2019-12-18 | End: 2019-12-18 | Stop reason: HOSPADM

## 2019-12-18 RX ORDER — MIDAZOLAM HYDROCHLORIDE 1 MG/ML
1 INJECTION, SOLUTION INTRAMUSCULAR; INTRAVENOUS AS NEEDED
Status: DISCONTINUED | OUTPATIENT
Start: 2019-12-18 | End: 2019-12-18 | Stop reason: HOSPADM

## 2019-12-18 RX ORDER — TRAMADOL HYDROCHLORIDE 50 MG/1
50 TABLET ORAL
Status: DISCONTINUED | OUTPATIENT
Start: 2019-12-18 | End: 2019-12-19 | Stop reason: HOSPADM

## 2019-12-18 RX ORDER — FENTANYL CITRATE 50 UG/ML
25 INJECTION, SOLUTION INTRAMUSCULAR; INTRAVENOUS
Status: DISCONTINUED | OUTPATIENT
Start: 2019-12-18 | End: 2019-12-18 | Stop reason: HOSPADM

## 2019-12-18 RX ORDER — FENTANYL CITRATE 50 UG/ML
INJECTION, SOLUTION INTRAMUSCULAR; INTRAVENOUS AS NEEDED
Status: DISCONTINUED | OUTPATIENT
Start: 2019-12-18 | End: 2019-12-18 | Stop reason: HOSPADM

## 2019-12-18 RX ORDER — SODIUM CHLORIDE 0.9 % (FLUSH) 0.9 %
5-40 SYRINGE (ML) INJECTION EVERY 8 HOURS
Status: DISCONTINUED | OUTPATIENT
Start: 2019-12-18 | End: 2019-12-18 | Stop reason: HOSPADM

## 2019-12-18 RX ORDER — DIPHENHYDRAMINE HYDROCHLORIDE 50 MG/ML
12.5 INJECTION, SOLUTION INTRAMUSCULAR; INTRAVENOUS
Status: DISCONTINUED | OUTPATIENT
Start: 2019-12-18 | End: 2019-12-19 | Stop reason: HOSPADM

## 2019-12-18 RX ORDER — LORAZEPAM 2 MG/ML
1 INJECTION INTRAMUSCULAR
Status: DISCONTINUED | OUTPATIENT
Start: 2019-12-18 | End: 2019-12-19 | Stop reason: HOSPADM

## 2019-12-18 RX ORDER — LIDOCAINE HYDROCHLORIDE 20 MG/ML
INJECTION, SOLUTION EPIDURAL; INFILTRATION; INTRACAUDAL; PERINEURAL AS NEEDED
Status: DISCONTINUED | OUTPATIENT
Start: 2019-12-18 | End: 2019-12-18 | Stop reason: HOSPADM

## 2019-12-18 RX ORDER — PROPOFOL 10 MG/ML
INJECTION, EMULSION INTRAVENOUS AS NEEDED
Status: DISCONTINUED | OUTPATIENT
Start: 2019-12-18 | End: 2019-12-18 | Stop reason: HOSPADM

## 2019-12-18 RX ORDER — MAG HYDROX/ALUMINUM HYD/SIMETH 200-200-20
SUSPENSION, ORAL (FINAL DOSE FORM) ORAL 2 TIMES DAILY
Status: DISCONTINUED | OUTPATIENT
Start: 2019-12-18 | End: 2019-12-19 | Stop reason: HOSPADM

## 2019-12-18 RX ORDER — ONDANSETRON 2 MG/ML
INJECTION INTRAMUSCULAR; INTRAVENOUS AS NEEDED
Status: DISCONTINUED | OUTPATIENT
Start: 2019-12-18 | End: 2019-12-18 | Stop reason: HOSPADM

## 2019-12-18 RX ORDER — MORPHINE SULFATE 10 MG/ML
2 INJECTION, SOLUTION INTRAMUSCULAR; INTRAVENOUS
Status: DISCONTINUED | OUTPATIENT
Start: 2019-12-18 | End: 2019-12-18 | Stop reason: HOSPADM

## 2019-12-18 RX ORDER — SODIUM CHLORIDE 9 MG/ML
125 INJECTION, SOLUTION INTRAVENOUS CONTINUOUS
Status: DISCONTINUED | OUTPATIENT
Start: 2019-12-18 | End: 2019-12-19 | Stop reason: HOSPADM

## 2019-12-18 RX ORDER — SODIUM CHLORIDE 0.9 % (FLUSH) 0.9 %
5-40 SYRINGE (ML) INJECTION AS NEEDED
Status: DISCONTINUED | OUTPATIENT
Start: 2019-12-18 | End: 2019-12-19 | Stop reason: HOSPADM

## 2019-12-18 RX ORDER — KETAMINE HYDROCHLORIDE 10 MG/ML
INJECTION, SOLUTION INTRAMUSCULAR; INTRAVENOUS AS NEEDED
Status: DISCONTINUED | OUTPATIENT
Start: 2019-12-18 | End: 2019-12-18 | Stop reason: HOSPADM

## 2019-12-18 RX ORDER — KETOROLAC TROMETHAMINE 30 MG/ML
INJECTION, SOLUTION INTRAMUSCULAR; INTRAVENOUS AS NEEDED
Status: DISCONTINUED | OUTPATIENT
Start: 2019-12-18 | End: 2019-12-18 | Stop reason: HOSPADM

## 2019-12-18 RX ORDER — DOCUSATE SODIUM 100 MG/1
100 CAPSULE, LIQUID FILLED ORAL 2 TIMES DAILY
Qty: 40 CAP | Refills: 1 | Status: SHIPPED | OUTPATIENT
Start: 2019-12-18

## 2019-12-18 RX ORDER — HYDROMORPHONE HYDROCHLORIDE 1 MG/ML
1 INJECTION, SOLUTION INTRAMUSCULAR; INTRAVENOUS; SUBCUTANEOUS
Status: DISCONTINUED | OUTPATIENT
Start: 2019-12-18 | End: 2019-12-19 | Stop reason: HOSPADM

## 2019-12-18 RX ORDER — FENTANYL CITRATE 50 UG/ML
50 INJECTION, SOLUTION INTRAMUSCULAR; INTRAVENOUS AS NEEDED
Status: DISCONTINUED | OUTPATIENT
Start: 2019-12-18 | End: 2019-12-18 | Stop reason: HOSPADM

## 2019-12-18 RX ORDER — DEXAMETHASONE SODIUM PHOSPHATE 4 MG/ML
INJECTION, SOLUTION INTRA-ARTICULAR; INTRALESIONAL; INTRAMUSCULAR; INTRAVENOUS; SOFT TISSUE AS NEEDED
Status: DISCONTINUED | OUTPATIENT
Start: 2019-12-18 | End: 2019-12-18 | Stop reason: HOSPADM

## 2019-12-18 RX ORDER — KETOROLAC TROMETHAMINE 30 MG/ML
15 INJECTION, SOLUTION INTRAMUSCULAR; INTRAVENOUS EVERY 6 HOURS
Status: DISCONTINUED | OUTPATIENT
Start: 2019-12-18 | End: 2019-12-19 | Stop reason: HOSPADM

## 2019-12-18 RX ORDER — HYDROCODONE BITARTRATE AND ACETAMINOPHEN 5; 325 MG/1; MG/1
1 TABLET ORAL AS NEEDED
Status: DISCONTINUED | OUTPATIENT
Start: 2019-12-18 | End: 2019-12-18 | Stop reason: HOSPADM

## 2019-12-18 RX ORDER — MIDAZOLAM HYDROCHLORIDE 1 MG/ML
0.5 INJECTION, SOLUTION INTRAMUSCULAR; INTRAVENOUS
Status: DISCONTINUED | OUTPATIENT
Start: 2019-12-18 | End: 2019-12-18 | Stop reason: HOSPADM

## 2019-12-18 RX ORDER — ONDANSETRON 2 MG/ML
4 INJECTION INTRAMUSCULAR; INTRAVENOUS AS NEEDED
Status: DISCONTINUED | OUTPATIENT
Start: 2019-12-18 | End: 2019-12-18 | Stop reason: HOSPADM

## 2019-12-18 RX ORDER — LIDOCAINE HYDROCHLORIDE 10 MG/ML
0.1 INJECTION, SOLUTION EPIDURAL; INFILTRATION; INTRACAUDAL; PERINEURAL AS NEEDED
Status: DISCONTINUED | OUTPATIENT
Start: 2019-12-18 | End: 2019-12-18 | Stop reason: HOSPADM

## 2019-12-18 RX ORDER — SODIUM CHLORIDE 9 MG/ML
50 INJECTION, SOLUTION INTRAVENOUS CONTINUOUS
Status: DISCONTINUED | OUTPATIENT
Start: 2019-12-18 | End: 2019-12-18 | Stop reason: HOSPADM

## 2019-12-18 RX ORDER — ONDANSETRON 2 MG/ML
4 INJECTION INTRAMUSCULAR; INTRAVENOUS
Status: DISCONTINUED | OUTPATIENT
Start: 2019-12-18 | End: 2019-12-19 | Stop reason: HOSPADM

## 2019-12-18 RX ADMIN — HYDROMORPHONE HYDROCHLORIDE 0.2 MG: 1 INJECTION, SOLUTION INTRAMUSCULAR; INTRAVENOUS; SUBCUTANEOUS at 12:45

## 2019-12-18 RX ADMIN — PROPOFOL 200 MG: 10 INJECTION, EMULSION INTRAVENOUS at 09:28

## 2019-12-18 RX ADMIN — KETAMINE HYDROCHLORIDE 30 MG: 10 INJECTION, SOLUTION INTRAMUSCULAR; INTRAVENOUS at 09:37

## 2019-12-18 RX ADMIN — ONDANSETRON HYDROCHLORIDE 4 MG: 2 INJECTION, SOLUTION INTRAMUSCULAR; INTRAVENOUS at 10:53

## 2019-12-18 RX ADMIN — SODIUM CHLORIDE 125 ML/HR: 900 INJECTION, SOLUTION INTRAVENOUS at 20:44

## 2019-12-18 RX ADMIN — FENTANYL CITRATE 50 MCG: 50 INJECTION, SOLUTION INTRAMUSCULAR; INTRAVENOUS at 09:28

## 2019-12-18 RX ADMIN — HYDROCORTISONE: 1 OINTMENT TOPICAL at 18:18

## 2019-12-18 RX ADMIN — ROCURONIUM BROMIDE 10 MG: 10 SOLUTION INTRAVENOUS at 09:28

## 2019-12-18 RX ADMIN — HYDROMORPHONE HYDROCHLORIDE 0.2 MG: 1 INJECTION, SOLUTION INTRAMUSCULAR; INTRAVENOUS; SUBCUTANEOUS at 11:34

## 2019-12-18 RX ADMIN — ACETAMINOPHEN 1000 MG: 10 INJECTION, SOLUTION INTRAVENOUS at 10:00

## 2019-12-18 RX ADMIN — HYDROMORPHONE HYDROCHLORIDE 0.2 MG: 1 INJECTION, SOLUTION INTRAMUSCULAR; INTRAVENOUS; SUBCUTANEOUS at 12:10

## 2019-12-18 RX ADMIN — CEFOTETAN DISODIUM 2 G: 2 INJECTION, POWDER, FOR SOLUTION INTRAMUSCULAR; INTRAVENOUS at 09:42

## 2019-12-18 RX ADMIN — HYDROMORPHONE HYDROCHLORIDE 0.2 MG: 1 INJECTION, SOLUTION INTRAMUSCULAR; INTRAVENOUS; SUBCUTANEOUS at 12:25

## 2019-12-18 RX ADMIN — KETOROLAC TROMETHAMINE 30 MG: 30 INJECTION, SOLUTION INTRAMUSCULAR; INTRAVENOUS at 10:53

## 2019-12-18 RX ADMIN — DEXAMETHASONE SODIUM PHOSPHATE 8 MG: 4 INJECTION, SOLUTION INTRAMUSCULAR; INTRAVENOUS at 09:39

## 2019-12-18 RX ADMIN — ACETAMINOPHEN 1000 MG: 10 INJECTION, SOLUTION INTRAVENOUS at 17:54

## 2019-12-18 RX ADMIN — MIDAZOLAM 2 MG: 1 INJECTION INTRAMUSCULAR; INTRAVENOUS at 09:22

## 2019-12-18 RX ADMIN — KETAMINE HYDROCHLORIDE 20 MG: 10 INJECTION, SOLUTION INTRAMUSCULAR; INTRAVENOUS at 10:05

## 2019-12-18 RX ADMIN — SODIUM CHLORIDE, POTASSIUM CHLORIDE, SODIUM LACTATE AND CALCIUM CHLORIDE: 600; 310; 30; 20 INJECTION, SOLUTION INTRAVENOUS at 09:22

## 2019-12-18 RX ADMIN — HYDROMORPHONE HYDROCHLORIDE 0.2 MG: 1 INJECTION, SOLUTION INTRAMUSCULAR; INTRAVENOUS; SUBCUTANEOUS at 11:50

## 2019-12-18 RX ADMIN — LIDOCAINE HYDROCHLORIDE 100 MG: 20 INJECTION, SOLUTION EPIDURAL; INFILTRATION; INTRACAUDAL; PERINEURAL at 09:28

## 2019-12-18 RX ADMIN — CEFAZOLIN 1 G: 1 INJECTION, POWDER, FOR SOLUTION INTRAMUSCULAR; INTRAVENOUS at 22:59

## 2019-12-18 RX ADMIN — SODIUM CHLORIDE 125 ML/HR: 900 INJECTION, SOLUTION INTRAVENOUS at 12:00

## 2019-12-18 RX ADMIN — ROCURONIUM BROMIDE 30 MG: 10 SOLUTION INTRAVENOUS at 09:39

## 2019-12-18 RX ADMIN — KETOROLAC TROMETHAMINE 15 MG: 30 INJECTION, SOLUTION INTRAMUSCULAR at 17:56

## 2019-12-18 RX ADMIN — Medication 10 ML: at 22:59

## 2019-12-18 RX ADMIN — CEFAZOLIN 1 G: 1 INJECTION, POWDER, FOR SOLUTION INTRAMUSCULAR; INTRAVENOUS at 17:54

## 2019-12-18 NOTE — DISCHARGE INSTRUCTIONS
[facility], [address], [address]  P [phone]  F [phone]     Rylee Browne      Dear Ms. Polly Rizviats,      Please review your instructions with your nurse and ask any questions so you have all the information you need to recover well at home. If you do not feel you have everything you need to succeed and be safe after you leave the hospital, please discuss these concerns with your nurse. As always, call for any questions at home. Your doctor: Audrey Dillon MD  Diagnosis: Pelvic mass [R19.00]  Procedure: Procedure(s):  LAPAROSCOPIC RESECTION OF MASS, TOTAL LAPAROSCOPIC HYSTERECTOMY, BILATERAL SALPINGO OOPHORECTOMY  Date of Discharge: 12/19/19      Take Home Medications     See Discharge Medication Review provided to you by your nurse. If you did not receive one, request this prior to your discharge. · It is important that you take your medications as they are prescribed. · Keep your medications in the bottles provided by the pharmacist and keep a list of the medication names, dosages, times to be taken and what they are for in your wallet. · Do not take other medications without consulting your doctor. · If you are prescribed enoxaparin (Lovenox) and are taking a baby aspirin daily, please hold this medication until your course of enoxaparin is completed. · You may take acetaminophen (Tylenol) and an NSAID such as ibuprofen (Advil) or Aleve for pain (but not both). If this is not sufficient for your pain, take tramadol or other pain medication you were provided. · You should take a daily gentle stool softener such as a colace pill or dulcolax suppository for constipation as this is not uncommon after surgery and/or while on pain medication. If not prescribed, this can be found over the counter. If constipation persists for >24 hours you should take a dose of Milk of Magnesia.  Call if your constipation continues. Diet    · Stay hydrated and drink fluids such as gatorade and water. This will also help prevent constipation and dehydration. Limit somewhat any usual caffeine intake of beverages such as soda, tea and coffee as this may serve to dehydrate you. · For the first 2-3 days keep a low fiber diet avoiding raw vegetables or fruits with skin. A diet consisting of soup, cereal, yogurt, eggs, fish, Boost/Ensure. Avoid fatty/greasy foods. · If nauseated, keep your diet limited to liquids and call if this persists. Activity    · If possible, have someone with you at all times until you feel stable. · Gradually increase your activity each day. There are generally no restrictions on walking, climbing stairs or riding in a car. Walk at least 4 times per day. Walking will help reduce the risk of blood clots and constipation. · Showers are okay. If you have an incision, no tub bathing/swimming for two weeks. · No driving for 2 week and/or while on pain medication. · The following restrictions apply:  1. No heavy lifting greater than 15 lbs for 4 weeks, then 25 lbs for the next 4 weeks. 2. If you had any vaginal procedure or a hysterectomy, nothing per vagina for 6-8 weeks. 3. You may experience vaginal spotting. Should the bleeding require more that 4 pads a day please call our office. Incision    · You should expect some discomfort in the area of your incision, particularly as you increase your activity. If you notice an area of increasing redness or new drainage, please call your doctor. · Your incisions will have buried, absorbable sutures, which do not need to be removed and are covered by protective glue. This will come off over time. They are covered with a surgical glue. Please allow this to fall off on it's own and refrain from peeling it off unless it is no longer covering the incision.       Causes For Concern    If any of the following occur, please call our office and speak with the Nurse/aid who will help you with your problem or ask the doctor to call you.  Problems with the incision, including increasing pain, swelling, redness or drainage.  Inability to pass urine    Increasing abdominal pain despite medication   Persisting nausea or vomiting.  Fever or chills and a temperature >101F   Constipation (no bowel movement for three days).  Diarrhea (more than three watery stools within 24 hours).  Excessive vaginal or wound bleeding.  If after hours and you cannot reach an on-call physician, call 911. Follow-Up    Call [phone] to schedule an appointment with Romulo Montana MD in 4 weeks. Information obtained by :  I understand that if any problems occur once I am at home I am to contact my physician. I understand and acknowledge receipt of the instructions indicated above.                                                                                                                                            Physician's or R.N.'s Signature                                                                  Date/Time                                                                                                                                              Patient or Representative Signature                                                          Date/Time

## 2019-12-18 NOTE — OP NOTES
Gynecologic Oncology Operative Report    Tani Chery  12/18/2019    Pre-operative dx:  Pelvic mass    Post-operative dx:  Pelvic mass    Procedure:  Laparoscopic resection of mass >10 cm, total laparoscopic hysterectomy, bilateral salpingooophorectomy    Surgeon:  Quincy Alcaraz MD    Assistant:  Neida Martinez PA-C     Anesthesia:  GETA    EBL:  25 cc    Complications:  None    Implants:  None    Specimens:   ID Type Source Tests Collected by Time Destination   1 : UTERUS, CERVIX, LEFT FALLOPIAN TUBE AND OVARY Fresh Uterus   Quentin Chavis MD 12/18/2019 1019 Pathology   2 : RIGHT ADNEXAL MASS, RIGHT TUBE AND OVARY Frozen Section Mass   Quentin Chavis MD 12/18/2019 1021 Pathology   1 : pelvic washings Fresh Pelvis   Quentin Chavis MD 12/18/2019 1014 Cytology     Operative indications:  76 yo WF with a large 18 cm cystic mass arising from the right ovary. It was very simple appearing, other than a thin septum. There was no ascites, lymphadenopathy, or peritoneal implants. Her CA-125 was normal.  I felt the mass was most likely benign, but due to the size it needed to be removed to confirm there was no evidence of malignancy. Operative findings:  Large, cystic right ovary. It was thin-walled and had no surface excrescences. No peritoneal abnormalities. Left tube and ovary normal.  Small mobile uterus. 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 No evidence of malignancy. Procedure in detail:  After the risks, benefits, indications, and alternatives of the procedure were discussed with the patient and informed consent was obtained, the patient was taken to the operating room. She was positively identified, administered general anesthesia, and then placed in the dorsal lithotomy position in [address] [place] [address]. An exam under anesthesia was performed. She was then prepped and draped in the usual fashion. A valdivia catheter was inserted, followed by a -Care uterine manipulator.   We then proceeded with laparoscopy. Due to the size of the mass, we elected to place our initial midline trocar several centimeters above the umbilicus. A small incision was made with an #11-blade scalpel. A 5 mm blade-less trocar was then directly inserted, with the camera in position to visualize safe entry. Once proper placement was confirmed, the abdomen was then insufflated with carbon dioxide. A 5 mm blade-less trocar was then inserted in each lower quadrant. These trocars were inserted under direct visualization to ensure safe entry. The abdomen and pelvis were then examined, with the above mentioned findings noted. Pelvic washings were obtained as well. The patient was then placed in Trendelenburg tilt and we then proceeded with the hysterectomy. The right adnexal mass was manipulated out of the pelvis for visibility. The left round ligament was identified, then coagulated and transected with the Harmonic Scalpel, allowing entry into the retroperitoneal space. The vesico-uterine peritoneum was divided with the Harmonic Scalpel from the left side across the midline, allowing visualization of the ring of the V-Care manipulator anteriorly. We then identified the left ureter and bluntly developed the perirectal space. The left uterine artery was identified at its origin off of the hypogastric artery, and it was coagulated and transected with the Harmonic Scalpel at that point, with the ureter being held on traction medially to ensure its safety. The left ovarian vessels were then isolated and then coagulated and transected with the Harmonic Scalpel. The posterior leaf of the broad ligament was then divided with the Harmonic Scalpel up to the level of the uterine body. We then directed our attention to the right side of the pelvis. The right round ligament was identified and then coagulated and transected with the Harmonic Scalpel, allowing entry into the retroperitoneal space.   The remaining vesico-uterine peritoneum was then divided with the Harmonic Scalpel on the right side. The right ureter was then identified and the perirectal space was bluntly developed. The right uterine artery was then identified at its origin off of the hypogastric artery. It was coagulated and transected with the Harmonic Scalpel at that point, with the ureter being held on traction medially to ensure its safety. The right ovarian vessels were then isolated and then coagulated and transected with the Harmonic Scalpel. The posterior leaf of the broad ligament was then divided with the Harmonic Scalpel up to the level of the uterine body. The right adnexal mass was  from the uterine cornua by dividing the proper ovarian ligament and fallopian tube with the Harmonic Scalpel. The mass was placed in the upper abdomen for later removal.  We then moved anteriorly and the bladder was sharply dissected off of the lower uterine segment and cervix until it was well below the ring of the [employer]. The uterine arteries were then skeletonized bilaterally and then coagulated and transected with the Harmonic Scalpel at the level of the V-Care ring. A circumferential colpotomy was then performed by using the active blade of the Harmonic Scalpel to cut down onto the V-Care ring. Once the colpotomy was completed, the uterine specimen was delivered transvaginally and sent to pathology. A bulb syringe was then placed into the vagina to maintain pneumoperitoneum. We attempted to place the mass in a bag that was inserted transvaginally into the abdomen. The mass was unfortunately too large to fit into the bag. Due to the simple nature and high likelihood of benign pathology, I decided to drain the mass, rather than making a large incision. The mass was grasped and elevated by the utero-ovarian ligament.   A small area was cauterized to create a weak spot in the superior aspect of the cyst.  A Nezhat suction device was then used to puncture the mass at that site and the mass was drained. There was no spillage whatsoever. Once drained, the mass was grasped by the puncture site to prevent any potential leakage. It was then delivered transvaginally and sent for frozen section pathology. The bulb syringe was then placed back into the vagina to maintain pneumoperitoneum for vaginal cuff closure. The cuff was then reapproximated with three figure-of-eight stitches of 2-0 PDS suture using the LSI RD-180 suturing device. The sutures were secured in place with the LSI Ti-Knot device. Excellent reapproximation and hemostasis was noted. The pelvis was then irrigated and all pedicles inspected. Once satisfied with hemostasis, the gas was then allowed to escape from the abdomen and the trocars were removed. She was then taken out of Trendelenburg tilt. The incision sites were closed with a stitch of 4-0 Monocryl, followed by a layer of Dermabond. The bulb syringe was then removed from the vagina. The patient was taken out of stirrups, awakened from anesthesia, and taken to the recovery room in stable condition. All instrument, sponge, and needle counts were correct.         Quincy Alcaraz MD  12/18/2019  10:38 AM

## 2019-12-18 NOTE — ANESTHESIA POSTPROCEDURE EVALUATION
Procedure(s):  LAPAROSCOPIC RESECTION OF MASS, TOTAL LAPAROSCOPIC HYSTERECTOMY, BILATERAL SALPINGO OOPHORECTOMY. general    <BSHSIANPOST>    Vitals Value Taken Time   BP 98/55 12/18/2019  2:00 PM   Temp 36.7 °C (98 °F) 12/18/2019 12:00 PM   Pulse 57 12/18/2019  2:08 PM   Resp 13 12/18/2019  2:08 PM   SpO2 95 % 12/18/2019  2:08 PM   Vitals shown include unvalidated device data.

## 2019-12-18 NOTE — BRIEF OP NOTE
BRIEF OPERATIVE NOTE    Date of Procedure: 12/18/2019   Preoperative Diagnosis: PELVIC MASS  Postoperative Diagnosis: PELVIC MASS    Procedure(s): LAPAROSCOPIC RESECTION OF MASS, TOTAL LAPAROSCOPIC HYSTERECTOMY, BILATERAL SALPINGOOOPHORECTOMY  Surgeon(s) and Role:     Rahul Morales MD - Primary         Surgical Assistant: Isaac Luis PA-C    Surgical Staff:  Circ-1: Nisreen Cardona RN  Circ-Relief: Rock Rand RN  Physician Assistant: ANABELLA Gar  Scrub Tech-1: Linette Pereztimoteo  Event Time In Time Out   Incision Start 6626    Incision Close       Anesthesia: General   Estimated Blood Loss: 25 cc  Specimens:   ID Type Source Tests Collected by Time Destination   1 : UTERUS, CERVIX, LEFT FALLOPIAN TUBE AND OVARY Fresh Uterus  Sveta Herrmann MD 12/18/2019 1019 Pathology   2 : RIGHT ADNEXAL MASS, RIGHT TUBE AND OVARY Frozen Section Mass  Sveta Herrmann MD 12/18/2019 1021 Pathology   1 : pelvic washings Fresh Pelvis  Sveta Herrmann MD 12/18/2019 1014 Cytology      Findings: Large, cystic right ovary. It was thin-walled and had no surface excrescences. No peritoneal abnormalities. Left tube and ovary normal.  Small mobile uterus. 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 No evidence of malignancy.    Complications: None  Implants: * No implants in log *    Chad Merlos MD

## 2019-12-18 NOTE — PERIOP NOTES
TRANSFER - OUT REPORT:    Verbal report given to Richar Gutierrez  being transferred to room 319 for routine post - op       Report consisted of patients Situation, Background, Assessment and   Recommendations(SBAR). Time Pre op antibiotic given:  0942  Anesthesia Stop time:  4471  Rosas Present on Transfer to floor: YES  Order for Rosas on Chart: YES,    Information from the following report(s) SBAR and MAR was reviewed with the receiving nurse. Opportunity for questions and clarification was provided. Is the patient on 02? YES       L/Min 2       Other     Is the patient on a monitor? NO    Is the nurse transporting with the patient? NO    Surgical Waiting Area notified of patient's transfer from PACU? YES    Lines:   Peripheral IV 12/18/19 Left;Posterior Hand (Active)   Site Assessment Clean, dry, & intact 12/18/2019 11:20 AM   Phlebitis Assessment 0 12/18/2019 12:00 PM   Infiltration Assessment 0 12/18/2019 12:00 PM   Dressing Status Clean, dry, & intact 12/18/2019 11:20 AM   Dressing Type Transparent 12/18/2019 11:20 AM   Hub Color/Line Status Infusing 12/18/2019 12:00 PM        The following personal items collected during your admission accompanied patient upon transfer:   Dental Appliance: Dental Appliances: (crowns molars)  Vision:    Hearing Aid:    Jewelry:    Clothing: Clothing: Other (comment)(clothing)  Other Valuables:  Other Valuables: Eyeglasses  Valuables sent to safe: Personal Items Sent to Safe: cell phone, drivers license

## 2019-12-18 NOTE — ANESTHESIA PREPROCEDURE EVALUATION
Relevant Problems   No relevant active problems       Anesthetic History   No history of anesthetic complications  PONV          Review of Systems / Medical History  Patient summary reviewed, nursing notes reviewed and pertinent labs reviewed    Pulmonary  Within defined limits  COPD               Neuro/Psych   Within defined limits           Cardiovascular  Within defined limits                     GI/Hepatic/Renal  Within defined limits              Endo/Other  Within defined limits           Other Findings              Physical Exam    Airway  Mallampati: II  TM Distance: > 6 cm  Neck ROM: normal range of motion   Mouth opening: Normal     Cardiovascular  Regular rate and rhythm,  S1 and S2 normal,  no murmur, click, rub, or gallop             Dental  No notable dental hx       Pulmonary  Breath sounds clear to auscultation               Abdominal  GI exam deferred       Other Findings            Anesthetic Plan    ASA: 3  Anesthesia type: general          Induction: Intravenous  Anesthetic plan and risks discussed with: Patient

## 2019-12-18 NOTE — PROGRESS NOTES
TRANSFER - IN REPORT:    Verbal report received from [facility] RN(name) on Naomy Dewayne  being received from PACU(unit) for routine progression of care      Report consisted of patients Situation, Background, Assessment and   Recommendations(SBAR). Information from the following report(s) SBAR, Kardex, OR Summary, Procedure Summary, Intake/Output and MAR was reviewed with the receiving nurse. Opportunity for questions and clarification was provided. Assessment completed upon patients arrival to unit and care assumed.        1430- patient on floor, assessment complete

## 2019-12-18 NOTE — ROUTINE PROCESS
Patient: Tani Chery MRN: [id number]  SSN: [id number] YOB: 1950  Age: 77 y.o. Sex: female Patient is status post Procedure(s): LAPAROSCOPIC RESECTION OF MASS, TOTAL LAPAROSCOPIC HYSTERECTOMY, BILATERAL SALPINGO OOPHORECTOMY. Surgeon(s) and Role: Chanelle Jefferson MD - Primary Local/Dose/Irrigation:  na 
 
                     
Airway - Endotracheal Tube 12/18/19 Oral (Active) Dressing/Packing:  Wound Abdomen 3 trocar sites-Dressing Type: Topical skin adhesive/glue (12/18/19 1105) Wound Vagina-Dressing Type: Kimmie-pad (12/18/19 1105) Splint/Cast:  ] Other:  na

## 2019-12-19 VITALS
HEART RATE: 73 BPM | SYSTOLIC BLOOD PRESSURE: 121 MMHG | TEMPERATURE: 97.3 F | HEIGHT: 64 IN | DIASTOLIC BLOOD PRESSURE: 74 MMHG | RESPIRATION RATE: 16 BRPM | WEIGHT: 165 LBS | OXYGEN SATURATION: 97 % | BODY MASS INDEX: 28.17 KG/M2

## 2019-12-19 LAB
ANION GAP SERPL CALC-SCNC: 3 MMOL/L (ref 5–15)
ANION GAP SERPL CALC-SCNC: 6 MMOL/L (ref 5–15)
BUN SERPL-MCNC: 15 MG/DL (ref 6–20)
BUN SERPL-MCNC: 20 MG/DL (ref 6–20)
BUN/CREAT SERPL: 21 (ref 12–20)
BUN/CREAT SERPL: 31 (ref 12–20)
CALCIUM SERPL-MCNC: 5 MG/DL (ref 8.5–10.1)
CALCIUM SERPL-MCNC: 7.8 MG/DL (ref 8.5–10.1)
CHLORIDE SERPL-SCNC: 111 MMOL/L (ref 97–108)
CHLORIDE SERPL-SCNC: 125 MMOL/L (ref 97–108)
CO2 SERPL-SCNC: 18 MMOL/L (ref 21–32)
CO2 SERPL-SCNC: 25 MMOL/L (ref 21–32)
CREAT SERPL-MCNC: 0.48 MG/DL (ref 0.55–1.02)
CREAT SERPL-MCNC: 0.94 MG/DL (ref 0.55–1.02)
ERYTHROCYTE [DISTWIDTH] IN BLOOD BY AUTOMATED COUNT: 14.6 % (ref 11.5–14.5)
ERYTHROCYTE [DISTWIDTH] IN BLOOD BY AUTOMATED COUNT: 14.8 % (ref 11.5–14.5)
GLUCOSE SERPL-MCNC: 108 MG/DL (ref 65–100)
GLUCOSE SERPL-MCNC: 81 MG/DL (ref 65–100)
HCT VFR BLD AUTO: 22.3 % (ref 35–47)
HCT VFR BLD AUTO: 40.9 % (ref 35–47)
HGB BLD-MCNC: 12.7 G/DL (ref 11.5–16)
HGB BLD-MCNC: 6.8 G/DL (ref 11.5–16)
MCH RBC QN AUTO: 30.7 PG (ref 26–34)
MCH RBC QN AUTO: 31.3 PG (ref 26–34)
MCHC RBC AUTO-ENTMCNC: 30.5 G/DL (ref 30–36.5)
MCHC RBC AUTO-ENTMCNC: 31.1 G/DL (ref 30–36.5)
MCV RBC AUTO: 102.8 FL (ref 80–99)
MCV RBC AUTO: 98.8 FL (ref 80–99)
NRBC # BLD: 0 K/UL (ref 0–0.01)
NRBC # BLD: 0 K/UL (ref 0–0.01)
NRBC BLD-RTO: 0 PER 100 WBC
NRBC BLD-RTO: 0 PER 100 WBC
PLATELET # BLD AUTO: 141 K/UL (ref 150–400)
PLATELET # BLD AUTO: 278 K/UL (ref 150–400)
PMV BLD AUTO: 10.2 FL (ref 8.9–12.9)
PMV BLD AUTO: 10.3 FL (ref 8.9–12.9)
POTASSIUM SERPL-SCNC: 2.8 MMOL/L (ref 3.5–5.1)
POTASSIUM SERPL-SCNC: 4.7 MMOL/L (ref 3.5–5.1)
RBC # BLD AUTO: 2.17 M/UL (ref 3.8–5.2)
RBC # BLD AUTO: 4.14 M/UL (ref 3.8–5.2)
SODIUM SERPL-SCNC: 139 MMOL/L (ref 136–145)
SODIUM SERPL-SCNC: 149 MMOL/L (ref 136–145)
WBC # BLD AUTO: 11.3 K/UL (ref 3.6–11)
WBC # BLD AUTO: 19.5 K/UL (ref 3.6–11)

## 2019-12-19 PROCEDURE — 85027 COMPLETE CBC AUTOMATED: CPT

## 2019-12-19 PROCEDURE — 74011250636 HC RX REV CODE- 250/636: Performed by: OBSTETRICS & GYNECOLOGY

## 2019-12-19 PROCEDURE — 36415 COLL VENOUS BLD VENIPUNCTURE: CPT

## 2019-12-19 PROCEDURE — 74011000258 HC RX REV CODE- 258: Performed by: OBSTETRICS & GYNECOLOGY

## 2019-12-19 PROCEDURE — 80048 BASIC METABOLIC PNL TOTAL CA: CPT

## 2019-12-19 PROCEDURE — 99218 HC RM OBSERVATION: CPT

## 2019-12-19 RX ADMIN — KETOROLAC TROMETHAMINE 15 MG: 30 INJECTION, SOLUTION INTRAMUSCULAR at 05:16

## 2019-12-19 RX ADMIN — Medication 10 ML: at 05:07

## 2019-12-19 RX ADMIN — CEFAZOLIN 1 G: 1 INJECTION, POWDER, FOR SOLUTION INTRAMUSCULAR; INTRAVENOUS at 06:26

## 2019-12-19 RX ADMIN — HYDROCORTISONE: 1 OINTMENT TOPICAL at 09:00

## 2019-12-19 RX ADMIN — KETOROLAC TROMETHAMINE 15 MG: 30 INJECTION, SOLUTION INTRAMUSCULAR at 00:29

## 2019-12-19 RX ADMIN — ACETAMINOPHEN 1000 MG: 10 INJECTION, SOLUTION INTRAVENOUS at 02:23

## 2019-12-19 RX ADMIN — SODIUM CHLORIDE 125 ML/HR: 900 INJECTION, SOLUTION INTRAVENOUS at 05:06

## 2019-12-19 NOTE — PROGRESS NOTES
Gynecologic Oncology - [address], [address] [facility]  P [phone]  F [phone]       Patient: Polly Hernandez  Admit Date: 12/18/2019  Admit Dx: Pelvic mass [R19.00]    Subjective:     Did well, no c/o ro events. Minimal pain  Labs returned error. Redrawn. Objective:     Date 12/18/19 0700 - 12/19/19 0659 12/19/19 0700 - 12/20/19 0659   Shift 6438-1146 7208-4320 24 Hour Total 7426-5573 6934-9819 24 Hour Total   INTAKE   P.O. 100 240 340        P. O. 100 240 340      I. V.(mL/kg/hr) 800(0.9) 1089.6(1.2) 1889.6(1.1)        I.V. 100  100        Volume (0.9% sodium chloride infusion)  1089. 6 1089. 6        Volume (lactated Ringers infusion) 700  700      Shift Total(mL/kg) 900(12) 1329. 6(17.8) 2229. 6(29.8)      OUTPUT   Urine(mL/kg/hr) 230(0.3) 800(0.9) 1030(0.6)        Urine Output 100  100        Urine Output (mL) ([REMOVED] Urinary Catheter 12/18/19) 130 800 930      Blood 15  15        Estimated Blood Loss 15  15      Shift Total(mL/kg) 245(3.3) 800(10.7) 1045(14)       529.6 1184.6      Weight (kg) 74.8 74.8 74.8 74.8 74.8 74.8       Physical Exam  /62 (BP 1 Location: Left arm, BP Patient Position: At rest)   Pulse 63   Temp 97.6 °F (36.4 °C)   Resp 16   Ht 5' 4\" (1.626 m)   Wt 165 lb (74.8 kg)   SpO2 93%   Breastfeeding No   BMI 28.32 kg/m²      General:  alert, cooperative, no distress     Cardiac:  Regular rate and rhythm        Lungs:  nonlabored  Abdomen:  soft, non-tender, without masses or organomegaly       Wound:  clean, dry, no drainage   Extremity: extremities normal, atraumatic, no cyanosis or edema    Wt Readings from Last 3 Encounters:   12/18/19 165 lb (74.8 kg)   12/12/19 165 lb (74.8 kg)   12/02/19 165 lb (74.8 kg)         Data Review      Recent Labs     12/19/19  0710 12/19/19  0449   WBC 19.5* 11.3*   HGB 12.7 6.8*   HCT 40.9 22.3*    141*     Recent Labs     12/19/19  0710 12/19/19  0449    149*   K 4.7 2.8*   CL 111* 125*   * 81   BUN 20 15   CREA 0.94 0.48*   CA 7.8* 5.0*         Assessment/Plan:     Admitted for Pelvic mass [R19.00]    Active Hospital Problems    Diagnosis Date Noted    Pelvic mass 12/18/2019       Kevon Restrepo 1 Day Post-Op Procedure(s):  LAPAROSCOPIC RESECTION OF MASS, TOTAL LAPAROSCOPIC HYSTERECTOMY, BILATERAL SALPINGO OOPHORECTOMY for PELVIC MASS    Onc: benign mucinous cyst. Final path pending  Heme/CV: Hemodynamically stable. hgb normal on repeat. Renal: good UOP/Cr normal  FEN/GI: diet as tolerated  ID/Wound: afeb, abd benign  Neuro: pain well controlled. PPX: ambulate, IS  Disposition: Doing well postoperatively. Home today. DC meds, instructions and f/u discussed.        ANABELLA Gates

## 2019-12-19 NOTE — PROGRESS NOTES
Bedside and Verbal shift change report given to Rita Noyola (oncoming nurse) by Uzair Enamorado (offgoing nurse). Report included the following information SBAR, Kardex, OR Summary, Procedure Summary, Intake/Output, MAR and Recent Results.

## 2019-12-19 NOTE — PROGRESS NOTES
0900 -  scheduled [employer] transportation for patient  @ 10:45a to [address], [address]. Patients  disabled and has a hard time leaving the house. Patient MUST be down at ER Entrance at 10:35a. Lyft ride will not wait. Update to 3ENsadie Beba Lightean. CM will continue to follow and assist with JASWINDER needs as they arise. Reyna Cervantes  MSN, BSCS, RN, CRM - [phone].    8868  -  State Observation notice (OBS) provided in writing to patient and/or caregiver to family of Kinsey Player y.o. old/female  as well as verbal explanation of the policy. Patients who are in outpatient status also receive the Observation notice. Sharla Prescott RN , [facility]    Kinsey Player y.o. old/female  Room# 319/01                                                             [address] Patient Guide to Observation & Outpatient Care    Thank you for choosing [address] [facility]. Current regulatory requirements necessitate we inform you that [address] or OBSERVATION status receiving care in our facility. Outpatient services include Observation services. Following are some frequently asked questions and answers that will help you understand outpatient observation status and billing. What is outpatient observation? Observation services are hospital outpatient services that a physician orders to allow for testing and medical evaluation of your condition. Your physician will decide whether you require an inpatient stay, will require care in another setting, or will be discharged home. You will be admitted if you are expected to need two or more midnights of medically necessary hospital care. What kinds of conditions usually require observation care? Observation services are typically ordered for conditions that can be treated in less than two midnights after surgery, or when the cause for your symptoms has not been determined.  Examples are nausea, vomiting, weakness, stomach pain, headache, kidney stones, fever, some breathing problems, and some types of chest pain. Does observation care count toward the three-day qualifying hospital stay needed for admission to a skilled nursing facility? No. Any of your time spent during an observation stay does not count toward Medicares three-day (consecutive) hospital stay rule. If your status changes from observation to inpatient, your three-day hospital day begins only from the time when you become an inpatient. How is an observation stay billed? An observation stay is billed under outpatient services (under Medicare this would be under Part B). What am I expected to pay for as an observation patient? Since observation stays are billed as outpatient services, your insurance co-pays and deductibles, along with any additional costs, including medications will be based on the outpatient terms of your policies. What if I dont have health insurance? If you do not have health care coverage, [employer] has financial counselors available to help with payment planning. Ask to meet with a counselor from [employer]. What happens when I reach the end of my observation stay? At the end of your observation stay, your physician will decide whether to discharge you from the hospital or to admit you as an inpatient. What if my physician decides my condition requires acute inpatient care? Your physician must then write an order to convert your outpatient observation stay to a full inpatient admission. Can I be placed into outpatient observation after undergoing an outpatient surgical procedure? It is possible. For example, Medicare allows for a 4-6 hour recovery period. The intent of outpatient surgery is to have your surgery and be discharged the same day. However, if you experience a post-operative complication, then your physician may place you into observation to monitor you further.      If I want to spend the night after my out- patient surgery, will Medicare cover this? No, Medicare will only pay if there is a medical condition that warrants postoperative monitoring. If you desire to stay over for patient/family convenience, you will be fully responsible for payment. I have more questions about my Observation Stay. Who can help me?  If you have any questions about your medical condition, please ask to speak with your care provider.  If you have questions about your transition home or services needed after discharge please ask to speak with a member of the [employer] care management department.  If you have financial or billing questions please contact a [place] [address] services representative at [phone] or your insurance carrier.  If you receive Medicare benefits, you can also call 1-800-MEDICARE (1-282.380.9280) for more information. TTY users should call [phone]. Please sign and date here to show you received this notice and understand your status.     Signature of Patient or Guardian                       Date    __________________________________                          _____________________

## 2019-12-19 NOTE — PROGRESS NOTES
Bedside and Verbal shift change report given to [employer] (oncoming nurse) by Elizabet Cordero (offgoing nurse). Report included the following information SBAR, Kardex, Intake/Output, MAR, Accordion and Recent Results. 0540: Lab calls with critical value. Calcium 5.0. Pt asymptomatic. Will await doctors rounds at 0700.

## 2019-12-19 NOTE — PROGRESS NOTES
I have reviewed discharge instructions and medications with the patient. The patient verbalized understanding.

## 2020-01-16 ENCOUNTER — OFFICE VISIT (OUTPATIENT)
Dept: GYNECOLOGY | Age: 70
End: 2020-01-16

## 2020-01-16 VITALS
WEIGHT: 158 LBS | SYSTOLIC BLOOD PRESSURE: 120 MMHG | HEIGHT: 64 IN | HEART RATE: 74 BPM | BODY MASS INDEX: 26.98 KG/M2 | DIASTOLIC BLOOD PRESSURE: 74 MMHG

## 2020-01-16 DIAGNOSIS — R19.00 PELVIC MASS: Primary | ICD-10-CM

## 2020-01-16 NOTE — PROGRESS NOTES
[address] [facility] [address], [address] [facility]  P [phone]  F [phone]    Postoperative Office Note  Patient ID:  Name: Sharda Villa  MRM: [id number]  : 1950/69 y.o. Date: 2020    Diagnosis:     ICD-10-CM ICD-9-CM    1. Pelvic mass R19.00 789.30        Problem List:   Patient Active Problem List   Diagnosis Code    Pelvic mass R19.00       SUBJECTIVE:    Sharda Villa is a 77 y.o. female who presents for followup postoperative care following Lsc resection of mass, TLH, BSO. Operative findings:  Large, cystic right ovary.  It was thin-walled and had no surface excrescences.  No peritoneal abnormalities.  Left tube and ovary normal.  Small mobile uterus.  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 No evidence of malignancy. The patient's pathology revealed: FINAL PATHOLOGIC DIAGNOSIS   1. Fallopian tube, ovary, right, salpingo-oophorectomy:   Fallopian tube: No histopathologic abnormality. Ovary: Mucinous cystadenoma. 2. Uterus, cervix, fallopian tube, ovary, hysterectomy, salpingo-oophorectomy:   Cervix: No evidence for dysplasia or malignancy. Endometrium: Inactive endometrium. Tubal metaplasia   Myometrium: Leiomyomata. Fallopian tube : No histopathologic abnormality. Ovaries: Benign physiologic changes. Currently she has no problems with eating, bowel movements, or voiding. Appetite is good. Eating a regular diet without difficulty. Bowel movements are regular. The patient is not having any pain. Medications:     Current Outpatient Medications on File Prior to Visit   Medication Sig Dispense Refill    docusate sodium (COLACE) 100 mg capsule Take 1 Cap by mouth two (2) times a day. Hold for loose stools. 40 Cap 1    ibuprofen (MOTRIN) 200 mg tablet Take 2 Tabs by mouth every eight (8) hours as needed for Pain. 50 Tab 0    acetaminophen (TYLENOL) 325 mg tablet Take 650 mg by mouth as needed for Pain. No current facility-administered medications on file prior to visit. Allergies:      Allergies   Allergen Reactions    Cat Dander Other (comments)    Ciprofloxacin Itching    Iodine Hives    Mustard Other (comments)    Sesame Seed Other (comments)    Shellfish Derived Other (comments)    Sulfa (Sulfonamide Antibiotics) Other (comments)     Swelling in throat and eyes    Tree Nut Other (comments)    Bee Venom Protein (Honey Bee) Swelling    Wasp [Venom-Wasp] Swelling     Past Medical History:   Diagnosis Date    Arthritis     Chronic pain     Cough     Emphysema of lung (HCC)     Gross hematuria     Hx: UTI (urinary tract infection)     Nausea & vomiting     Pelvic mass     Renal cyst      Past Surgical History:   Procedure Laterality Date    BREAST SURGERY PROCEDURE UNLISTED Left 1973    CYST REMOVED    HX ADENOIDECTOMY  1955    HX GI      COLONOSCOPY    HX HEENT  1970    BROKEN NOSE    HX OTHER SURGICAL Left     fibrocystic tumor  left breast    HX POLYPECTOMY  2009    HX TONSILLECTOMY  1955    HX UROLOGICAL  11/2019    CYSTOSCOPY    HX UROLOGICAL  1999    CYSTOSCOPY    HX UROLOGICAL  7005,3712    CYSTOSCOPY     Social History     Socioeconomic History    Marital status:      Spouse name: Not on file    Number of children: Not on file    Years of education: Not on file    Highest education level: Not on file   Occupational History    Not on file   Social Needs    Financial resource strain: Not on file    Food insecurity:     Worry: Not on file     Inability: Not on file    Transportation needs:     Medical: Not on file     Non-medical: Not on file   Tobacco Use    Smoking status: Current Every Day Smoker     Packs/day: 1.00     Years: 54.00     Pack years: 54.00    Smokeless tobacco: Never Used   Substance and Sexual Activity    Alcohol use: Never     Frequency: Never    Drug use: Never    Sexual activity: Not on file   Lifestyle    Physical activity: Days per week: Not on file     Minutes per session: Not on file    Stress: Not on file   Relationships    Social connections:     Talks on phone: Not on file     Gets together: Not on file     Attends [employer] service: Not on file     Active member of club or organization: Not on file     Attends meetings of clubs or organizations: Not on file     Relationship status: Not on file    Intimate partner violence:     Fear of current or ex partner: Not on file     Emotionally abused: Not on file     Physically abused: Not on file     Forced sexual activity: Not on file   Other Topics Concern    Not on file   Social History Narrative    Not on file       OBJECTIVE:    Vitals:   Vitals:    01/16/20 1129   BP: 120/74   Pulse: 74   Weight: 158 lb (71.7 kg)   Height: 5' 4.02\" (1.626 m)     Physical Examination:     General:  alert, cooperative, no distress   Lungs: lungs clear to auscultation   Cardiac: Regular rate and rhythm   Abdomen: soft, bowel sounds active, non-tender  No CVA tenderness   Incision: healing well   Pelvic: External genitalia: normal general appearance  Urinary system: urethral meatus normal  Vaginal: normal without tenderness, induration or masses  Cervix: absent  Adnexa: removed surgically  Uterus: absent   Rectal: not done   Extremity:   extremities normal, atraumatic, no cyanosis or edema     IMPRESSION:    Maulik Sandhu is doing well postoperatively. She has no evidence of malignancy and no apparent complications from surgery. .    Medical problems:     Patient Active Problem List   Diagnosis Code    Pelvic mass R19.00       PLAN:    The operative procedures and clinical results have been reviewed with the patient. Implications of diagnosis discussed at length. All questions answered. The patient may resume all activity at this point. I will see the patient back prn. The patient is advised to call our office with any problems or concerns.     Palak Cortes MD  1/16/2020/11:37 AM

## (undated) DEVICE — DERMABOND SKIN ADH 0.7ML -- DERMABOND ADVANCED 12/BX

## (undated) DEVICE — Device

## (undated) DEVICE — GARMENT,MEDLINE,DVT,INT,CALF,MED, GEN2: Brand: MEDLINE

## (undated) DEVICE — INFECTION CONTROL KIT SYS

## (undated) DEVICE — TRAY PREP DRY W/ PREM GLV 2 APPL 6 SPNG 2 UNDPD 1 OVERWRAP

## (undated) DEVICE — GLOVE SURG SZ 75 L1212IN FNGR THK138MIL BRN LTX FREE

## (undated) DEVICE — LAPAROSCOPIC TROCAR SLEEVE/SINGLE USE: Brand: KII® OPTICAL ACCESS SYSTEM

## (undated) DEVICE — TK® QUICK LOAD® UNIT: Brand: TK® QUICK LOAD®

## (undated) DEVICE — COVER LT HNDL PLAS RIG 1 PER PK

## (undated) DEVICE — STERILE POLYISOPRENE POWDER-FREE SURGICAL GLOVES WITH EMOLLIENT COATING: Brand: PROTEXIS

## (undated) DEVICE — SUTURE MCRYL SZ 4-0 L27IN ABSRB UD L19MM PS-2 1/2 CIR PRIM Y426H

## (undated) DEVICE — PAD,SANITARY,11 IN,MAXI,N-STRL,IND WRAP: Brand: MEDLINE

## (undated) DEVICE — TOTAL TRAY, DB, 100% SILI FOLEY, 16FR 10: Brand: MEDLINE

## (undated) DEVICE — SURGICAL PROCEDURE PACK GYN LAPAROSCOPY CUST SMH LF

## (undated) DEVICE — REM POLYHESIVE ADULT PATIENT RETURN ELECTRODE: Brand: VALLEYLAB

## (undated) DEVICE — (D)PREP SKN CHLRAPRP APPL 26ML -- CONVERT TO ITEM 371833

## (undated) DEVICE — STERILE NEOPRENE POWDER-FREE SURGICAL GLOVES WITH NITRILE COATING: Brand: PROTEXIS

## (undated) DEVICE — SHEAR HARMONIC ACET 5MMX36CM -- ACE PLUS

## (undated) DEVICE — VCARE MEDIUM, UTERINE MANIPULATOR, VAGINAL-CERVICAL-AHLUWALIA'S-RETRACTOR-ELEVATOR: Brand: VCARE

## (undated) DEVICE — SOLUTION IV 1000ML 0.9% SOD CHL

## (undated) DEVICE — RD® QUICK LOAD® SURGICAL SUTURE, 2-0 MONOGLIDE®, ABSORBABLE, 53", PURPLE, MONOFILAMENT: Brand: RD® QUICK LOAD®

## (undated) DEVICE — STRAP,POSITIONING,KNEE/BODY,FOAM,4X60": Brand: MEDLINE

## (undated) DEVICE — TROCAR: Brand: KII® SLEEVE

## (undated) DEVICE — CANISTER, RIGID, 3000CC: Brand: MEDLINE INDUSTRIES, INC.

## (undated) DEVICE — DRAPE,REIN 53X77,STERILE: Brand: MEDLINE

## (undated) DEVICE — BLADE ASSEMB CLP HAIR FINE --

## (undated) DEVICE — SYR 50ML LR LCK 1ML GRAD NSAF --

## (undated) DEVICE — TISSUE RETRIEVAL SYSTEM: Brand: INZII RETRIEVAL SYSTEM

## (undated) DEVICE — 5MM RD180® DEVICE: Brand: RD180® - THE RUNNING DEVICE®

## (undated) DEVICE — TK® TI-KNOT® DEVICE: Brand: TK® TI-KNOT®